# Patient Record
Sex: MALE | Race: WHITE | NOT HISPANIC OR LATINO | URBAN - METROPOLITAN AREA
[De-identification: names, ages, dates, MRNs, and addresses within clinical notes are randomized per-mention and may not be internally consistent; named-entity substitution may affect disease eponyms.]

---

## 2024-12-13 ENCOUNTER — APPOINTMENT (OUTPATIENT)
Dept: RADIOLOGY | Facility: HOSPITAL | Age: 54
End: 2024-12-13
Payer: COMMERCIAL

## 2024-12-17 ENCOUNTER — LAB (OUTPATIENT)
Dept: LAB | Facility: LAB | Age: 54
End: 2024-12-17
Payer: COMMERCIAL

## 2024-12-17 DIAGNOSIS — M19.90 ARTHRITIS: Primary | ICD-10-CM

## 2024-12-17 LAB
CCP IGG SERPL-ACNC: <1 U/ML
CRP SERPL-MCNC: 0.63 MG/DL
ERYTHROCYTE [SEDIMENTATION RATE] IN BLOOD BY WESTERGREN METHOD: 23 MM/H (ref 0–20)
RHEUMATOID FACT SER NEPH-ACNC: <10 IU/ML (ref 0–15)
URATE SERPL-MCNC: 5.6 MG/DL (ref 4–7.5)

## 2024-12-17 PROCEDURE — 84550 ASSAY OF BLOOD/URIC ACID: CPT

## 2024-12-17 PROCEDURE — 86200 CCP ANTIBODY: CPT

## 2024-12-17 PROCEDURE — 86038 ANTINUCLEAR ANTIBODIES: CPT

## 2024-12-17 PROCEDURE — 86431 RHEUMATOID FACTOR QUANT: CPT

## 2024-12-17 PROCEDURE — 85652 RBC SED RATE AUTOMATED: CPT

## 2024-12-17 PROCEDURE — 86140 C-REACTIVE PROTEIN: CPT

## 2024-12-17 PROCEDURE — 36415 COLL VENOUS BLD VENIPUNCTURE: CPT

## 2024-12-18 LAB — ANA SER QL HEP2 SUBST: NEGATIVE

## 2025-03-15 ENCOUNTER — OFFICE VISIT (OUTPATIENT)
Dept: URGENT CARE | Age: 55
End: 2025-03-15
Payer: COMMERCIAL

## 2025-03-15 VITALS
RESPIRATION RATE: 18 BRPM | HEART RATE: 76 BPM | WEIGHT: 170 LBS | DIASTOLIC BLOOD PRESSURE: 71 MMHG | HEIGHT: 70 IN | BODY MASS INDEX: 24.34 KG/M2 | OXYGEN SATURATION: 97 % | TEMPERATURE: 97.6 F | SYSTOLIC BLOOD PRESSURE: 121 MMHG

## 2025-03-15 DIAGNOSIS — S61.211A LACERATION OF LEFT INDEX FINGER, FOREIGN BODY PRESENCE UNSPECIFIED, NAIL DAMAGE STATUS UNSPECIFIED, INITIAL ENCOUNTER: Primary | ICD-10-CM

## 2025-03-15 RX ORDER — CEPHALEXIN 500 MG/1
500 CAPSULE ORAL 3 TIMES DAILY
Qty: 15 CAPSULE | Refills: 0 | Status: SHIPPED | OUTPATIENT
Start: 2025-03-15 | End: 2025-03-20

## 2025-03-15 ASSESSMENT — ENCOUNTER SYMPTOMS
WOUND: 1
OCCASIONAL FEELINGS OF UNSTEADINESS: 0
LOSS OF SENSATION IN FEET: 0
DEPRESSION: 0

## 2025-03-15 ASSESSMENT — PAIN SCALES - GENERAL: PAINLEVEL_OUTOF10: 10-WORST PAIN EVER

## 2025-03-15 NOTE — PROGRESS NOTES
"Subjective   Patient ID: Ryan Hwang is a 54 y.o. male. They present today with a chief complaint of Injury (Cut left index yesterday. ).    History of Present Illness  Ryan Hwang is a 54 y.o. male who presents to the clinic for left index finger laceration.  Patient states he cut his finger yesterday at 2 PM with a .  Patient is up-to-date on his tetanus shot. Pt denies any chest pain, sob, N/V at this time in clinic.             Past Medical History  Allergies as of 03/15/2025    (No Known Allergies)       (Not in a hospital admission)       No past medical history on file.    No past surgical history on file.     reports that he has never smoked. He has never used smokeless tobacco. He reports that he does not drink alcohol and does not use drugs.    Review of Systems  Review of Systems   Skin:  Positive for wound.                                  Objective    Vitals:    03/15/25 1221   BP: 121/71   Pulse: 76   Resp: 18   Temp: 36.4 °C (97.6 °F)   SpO2: 97%   Weight: 77.1 kg (170 lb)   Height: 1.778 m (5' 10\")     No LMP for male patient.    Physical Exam  Constitutional:       Appearance: Normal appearance.   Skin:     Findings: Laceration present.             Comments: Distal left index finger sensation intact, cap refill less than 2 seconds  Laceration 2 cm distal left index finger.  Pt has full ROM of DIP and PIP joint of left index finger.    Neurological:      General: No focal deficit present.      Mental Status: He is alert and oriented to person, place, and time. Mental status is at baseline.   Psychiatric:         Mood and Affect: Mood normal.         Behavior: Behavior normal.         Laceration Repair    Date/Time: 3/15/2025 1:53 PM    Performed by: VALARIE Enriquez  Authorized by: VALARIE Enriquez    Consent:     Consent obtained:  Verbal and written    Consent given by:  Patient    Risks, benefits, and alternatives were discussed: yes      Risks discussed:  " Infection, pain and need for additional repair    Alternatives discussed:  No treatment  Universal protocol:     Patient identity confirmed:  Verbally with patient  Anesthesia:     Anesthesia method:  None  Laceration details:     Location:  Finger    Finger location:  L index finger    Length (cm):  2  Treatment:     Wound cleansed with: Hibiclens.    Irrigation solution:  Sterile saline    Visualized foreign bodies/material removed: no    Skin repair:     Repair method:  Steri-Strips and tissue adhesive    Number of Steri-Strips:  3  Approximation:     Approximation:  Loose  Repair type:     Repair type:  Simple  Post-procedure details:     Dressing:  Open (no dressing)    Procedure completion:  Tolerated      Point of Care Test & Imaging Results from this visit  No results found for this visit on 03/15/25.   No results found.    Diagnostic study results (if any) were reviewed by VALARIE Enriquez.    Assessment/Plan   Allergies, medications, history, and pertinent labs/EKGs/Imaging reviewed by VALARIE Enriquez.     Medical Decision Making   Laceration in office able to be closed with steri strips and dermabond. - No evidence of retained FB as well as any vascular/nerve/tendon/osseous injury. Prophylactic antibiotics are warranted at this time.  Case reviewed with supervising physician Dr. Muñoz.  Due to 24 hours of open wound, we will use Dermabond and Steri-Strips with prophylactic antibiotics for coverage.  Pt is counseled on local wound care.  return to clinic if any new signs or symptoms develop. Otherwise follow with PCP.  -Advised patient if worsening symptoms or redness streaking up the arm or numbness tingling into the fingertips to go to local emergency room for further evaluation.  Patient verbalized understanding.      Orders and Diagnoses  Diagnoses and all orders for this visit:  Laceration of left index finger, foreign body presence unspecified, nail damage status unspecified,  initial encounter  -     cephalexin (Keflex) 500 mg capsule; Take 1 capsule (500 mg) by mouth 3 times a day for 5 days.      Medical Admin Record      Patient disposition: Home    Electronically signed by Feilpe Peterson, APRN-CNP  1:50 PM

## 2025-03-15 NOTE — PATIENT INSTRUCTIONS
Keflex 1 tablet 3 times a day for 5 days.    Please keep your finger in metal brace for 24 hours.  After 24 hours okay to take the finger out of the brace.  Please keep the area dry for 24 hours.  After 24 hours okay to wash hands.  The glue and Steri-Strips will fall off on their own.  Please do not scratch or pick at the glue.  If you notice numbness or tingling to the fingertip please go to local emergency room for further evaluation.    If you notice swelling or streaking up the hand please go to local emergency department for further evaluation.    Please follow up with your primary provider within one week if symptoms do not improve.  You may schedule an appointment online at [email or web address] or call [phone]. Go to the Emergency Department if symptoms significantly worsen or if you develop chest pain or shortness of breath.